# Patient Record
Sex: FEMALE | URBAN - METROPOLITAN AREA
[De-identification: names, ages, dates, MRNs, and addresses within clinical notes are randomized per-mention and may not be internally consistent; named-entity substitution may affect disease eponyms.]

---

## 2021-07-21 ENCOUNTER — TELEPHONE (OUTPATIENT)
Dept: ENT CLINIC | Age: 81
End: 2021-07-21

## 2023-09-09 LAB
CHOLESTEROL (MG/DL) IN SER/PLAS: 156 MG/DL (ref 0–199)
CHOLESTEROL IN HDL (MG/DL) IN SER/PLAS: 69.6 MG/DL
CHOLESTEROL/HDL RATIO: 2.2
LDL: 53 MG/DL (ref 0–99)
TRIGLYCERIDE (MG/DL) IN SER/PLAS: 165 MG/DL (ref 0–149)
VLDL: 33 MG/DL (ref 0–40)

## 2023-12-08 PROBLEM — I22.2 SUBSEQUENT NON-ST ELEVATION (NSTEMI) MYOCARDIAL INFARCTION (MULTI): Status: ACTIVE | Noted: 2023-12-08

## 2023-12-08 PROBLEM — M25.551 HIP PAIN, RIGHT: Status: ACTIVE | Noted: 2023-12-08

## 2023-12-08 PROBLEM — M47.816 SPONDYLOSIS OF LUMBAR SPINE: Status: ACTIVE | Noted: 2023-12-08

## 2023-12-08 PROBLEM — R26.89 IMPAIRED GAIT AND MOBILITY: Status: ACTIVE | Noted: 2023-12-08

## 2023-12-08 PROBLEM — E78.5 HYPERLIPIDEMIA: Status: ACTIVE | Noted: 2023-12-08

## 2023-12-08 PROBLEM — I48.0 PAROXYSMAL ATRIAL FIBRILLATION (MULTI): Status: ACTIVE | Noted: 2023-12-08

## 2023-12-08 PROBLEM — M25.572 ANKLE PAIN, LEFT: Status: ACTIVE | Noted: 2023-12-08

## 2023-12-08 PROBLEM — M54.50 LOWER BACK PAIN: Status: ACTIVE | Noted: 2023-12-08

## 2023-12-08 PROBLEM — I47.29 OTHER VENTRICULAR TACHYCARDIA (MULTI): Status: ACTIVE | Noted: 2023-12-08

## 2023-12-08 PROBLEM — Z86.79 PERSONAL HISTORY OF OTHER DISEASES OF THE CIRCULATORY SYSTEM: Status: ACTIVE | Noted: 2023-12-08

## 2023-12-08 PROBLEM — I10 BENIGN ESSENTIAL HYPERTENSION: Status: ACTIVE | Noted: 2023-12-08

## 2023-12-08 PROBLEM — M43.16 SPONDYLOLISTHESIS OF LUMBAR REGION: Status: ACTIVE | Noted: 2023-12-08

## 2023-12-08 PROBLEM — R00.2 PALPITATIONS: Status: ACTIVE | Noted: 2023-12-08

## 2023-12-08 PROBLEM — M54.16 LUMBAR RADICULOPATHY, CHRONIC: Status: ACTIVE | Noted: 2023-12-08

## 2023-12-08 PROBLEM — M48.061 LUMBAR STENOSIS: Status: ACTIVE | Noted: 2023-12-08

## 2023-12-08 PROBLEM — I47.10 PAROXYSMAL SUPRAVENTRICULAR TACHYCARDIA (CMS-HCC): Status: ACTIVE | Noted: 2023-12-08

## 2023-12-08 PROBLEM — R06.02 SHORTNESS OF BREATH: Status: ACTIVE | Noted: 2023-12-08

## 2023-12-08 PROBLEM — Z98.61 S/P PTCA (PERCUTANEOUS TRANSLUMINAL CORONARY ANGIOPLASTY): Status: ACTIVE | Noted: 2023-12-08

## 2023-12-08 PROBLEM — Z86.73 PERSONAL HISTORY OF TRANSIENT ISCHEMIC ATTACK (TIA), AND CEREBRAL INFARCTION WITHOUT RESIDUAL DEFICITS: Status: ACTIVE | Noted: 2023-12-08

## 2023-12-08 PROBLEM — M19.072 PRIMARY OSTEOARTHRITIS OF LEFT ANKLE: Status: ACTIVE | Noted: 2023-12-08

## 2023-12-08 PROBLEM — M51.369 DEGENERATION OF INTERVERTEBRAL DISC OF LUMBAR REGION: Status: ACTIVE | Noted: 2023-12-08

## 2023-12-08 PROBLEM — R29.898 WEAKNESS OF BOTH HIPS: Status: ACTIVE | Noted: 2023-12-08

## 2023-12-08 PROBLEM — I51.7 LEFT VENTRICULAR HYPERTROPHY: Status: ACTIVE | Noted: 2023-12-08

## 2023-12-08 PROBLEM — M51.36 DEGENERATION OF INTERVERTEBRAL DISC OF LUMBAR REGION: Status: ACTIVE | Noted: 2023-12-08

## 2023-12-08 PROBLEM — I25.10 ASHD (ARTERIOSCLEROTIC HEART DISEASE): Status: ACTIVE | Noted: 2023-12-08

## 2023-12-08 RX ORDER — PHENOL 1.4 %
1 AEROSOL, SPRAY (ML) MUCOUS MEMBRANE DAILY
COMMUNITY
Start: 2018-02-14

## 2023-12-08 RX ORDER — CLOPIDOGREL BISULFATE 75 MG/1
1 TABLET ORAL DAILY
COMMUNITY
Start: 2016-03-31

## 2023-12-08 RX ORDER — ACETAMINOPHEN 500 MG
TABLET ORAL
COMMUNITY
Start: 2017-02-14

## 2023-12-08 RX ORDER — CALCIUM CARBONATE 600 MG
1 TABLET ORAL DAILY
COMMUNITY
Start: 2017-02-14

## 2023-12-08 RX ORDER — METOPROLOL TARTRATE 50 MG/1
1 TABLET ORAL 2 TIMES DAILY
COMMUNITY
Start: 2016-02-17 | End: 2024-04-11 | Stop reason: WASHOUT

## 2023-12-08 RX ORDER — ATORVASTATIN CALCIUM 80 MG/1
1 TABLET, FILM COATED ORAL DAILY
COMMUNITY
Start: 2016-04-15

## 2023-12-08 RX ORDER — AMIODARONE HYDROCHLORIDE 200 MG/1
1 TABLET ORAL DAILY
COMMUNITY
Start: 2022-03-16 | End: 2024-03-14 | Stop reason: SDUPTHER

## 2023-12-28 ENCOUNTER — LAB (OUTPATIENT)
Dept: LAB | Facility: LAB | Age: 83
End: 2023-12-28
Payer: MEDICARE

## 2023-12-28 ENCOUNTER — HOSPITAL ENCOUNTER (OUTPATIENT)
Dept: RADIOLOGY | Facility: HOSPITAL | Age: 83
Discharge: HOME | End: 2023-12-28
Payer: MEDICARE

## 2023-12-28 DIAGNOSIS — I51.7 CARDIOMEGALY: ICD-10-CM

## 2023-12-28 DIAGNOSIS — R06.02 SHORTNESS OF BREATH: ICD-10-CM

## 2023-12-28 DIAGNOSIS — E78.5 HYPERLIPIDEMIA, UNSPECIFIED: ICD-10-CM

## 2023-12-28 DIAGNOSIS — Z91.89 OTHER SPECIFIED PERSONAL RISK FACTORS, NOT ELSEWHERE CLASSIFIED: Primary | ICD-10-CM

## 2023-12-28 DIAGNOSIS — Z91.89 OTHER SPECIFIED PERSONAL RISK FACTORS, NOT ELSEWHERE CLASSIFIED: ICD-10-CM

## 2023-12-28 LAB
ALT SERPL W P-5'-P-CCNC: 37 U/L (ref 7–45)
ANION GAP SERPL CALC-SCNC: 12 MMOL/L (ref 10–20)
AST SERPL W P-5'-P-CCNC: 30 U/L (ref 9–39)
BUN SERPL-MCNC: 17 MG/DL (ref 6–23)
CALCIUM SERPL-MCNC: 9.2 MG/DL (ref 8.6–10.3)
CHLORIDE SERPL-SCNC: 104 MMOL/L (ref 98–107)
CO2 SERPL-SCNC: 30 MMOL/L (ref 21–32)
CREAT SERPL-MCNC: 0.93 MG/DL (ref 0.5–1.05)
GFR SERPL CREATININE-BSD FRML MDRD: 60 ML/MIN/1.73M*2
GLUCOSE SERPL-MCNC: 78 MG/DL (ref 74–99)
POTASSIUM SERPL-SCNC: 4.1 MMOL/L (ref 3.5–5.3)
SODIUM SERPL-SCNC: 142 MMOL/L (ref 136–145)
TSH SERPL-ACNC: 11.84 MIU/L (ref 0.44–3.98)

## 2023-12-28 PROCEDURE — 84460 ALANINE AMINO (ALT) (SGPT): CPT

## 2023-12-28 PROCEDURE — 71045 X-RAY EXAM CHEST 1 VIEW: CPT | Performed by: RADIOLOGY

## 2023-12-28 PROCEDURE — 80048 BASIC METABOLIC PNL TOTAL CA: CPT

## 2023-12-28 PROCEDURE — 36415 COLL VENOUS BLD VENIPUNCTURE: CPT

## 2023-12-28 PROCEDURE — 84450 TRANSFERASE (AST) (SGOT): CPT

## 2023-12-28 PROCEDURE — 71045 X-RAY EXAM CHEST 1 VIEW: CPT

## 2023-12-28 PROCEDURE — 84443 ASSAY THYROID STIM HORMONE: CPT

## 2023-12-29 NOTE — RESULT ENCOUNTER NOTE
Abnormal results, Please let Dr Pringle know aboutr TSH and when she can stop amiodarone and also fwd to PCP for action, please let pt know

## 2024-01-03 ENCOUNTER — TELEPHONE (OUTPATIENT)
Dept: CARDIOLOGY | Facility: CLINIC | Age: 84
End: 2024-01-03
Payer: MEDICARE

## 2024-01-03 RX ORDER — LEVOTHYROXINE SODIUM 25 UG/1
25 TABLET ORAL
COMMUNITY
End: 2024-04-11 | Stop reason: WASHOUT

## 2024-01-03 NOTE — TELEPHONE ENCOUNTER
Called her with lab and chest xray results from 12/28/23.  Chest xray is stable.  TSH is more elevated.  She is on levothyroxine - medication list updated with that.  She has an appointment coming up with Dr. Melendez later this month.  She has follow up scheduled with JANET Christian  1/16/2024 at 2:30 PM.  She confirmed that appointment.    Once reviewed by Dr. Pringle, will fax results and plan to Dr. Melendez.

## 2024-01-03 NOTE — TELEPHONE ENCOUNTER
----- Message from Christophe Jones MD sent at 12/29/2023  2:04 PM EST -----  Results reviewed. RN to call patient. No critical results, follow up as usual to discuss in details.

## 2024-01-10 NOTE — PROGRESS NOTES
Chief Complaint/Reason for Visit:   Patient is coming in today as a 6 month Cardiovascular follow up.     History Of Present Illness:      Ms. Ladd is coming in today as a 6-month cardiovascular follow-up.  We have followed this patient previously for coronary artery disease, atrial fibrillation,  Paroxysmal SVT,  Hypertrophic cardiomyopathy  Due to hypertensive heart disease,  And a prior TIA.  In 2020  Patient had an NSTEMI  Requiring a mid LAD stent.  Patient has had prior cardioversions in 2021  And again in 2022.  At her last office visit,  She was maintaining sinus rhythm with the use of amiodarone.  Recent chest x-ray done in December showed no acute cardiopulmonary disease.  Amiodarone labs done at the same time showed  An elevated TSH (11.84),  We recommended follow-up with her PCP.    Patient comes in today without any cardiovascular complaints.  She denies any chest pain, pressure, palpitations, or orthopnea.  She does report chronic lower extremity edema which is at baseline.  She tells me she is fairly sedentary and also eats a lot of salty foods.  Patient has had no recent hospitalizations or emergency room visits.  As far as she can tell, she is maintaining sinus rhythm with the use of amiodarone.    Past Medical History:  She has a past medical history of Other ventricular tachycardia (CMS/HCC), Personal history of other diseases of the circulatory system, and Personal history of transient ischemic attack (TIA), and cerebral infarction without residual deficits.    Past Surgical History:  She has a past surgical history that includes Hysterectomy (02/07/2017) and Other surgical history (02/07/2017).      Social History:  She reports that she has never smoked. She has never used smokeless tobacco. Alcohol use questions deferred to the physician. Drug use questions deferred to the physician.    Family History:  No family history on file.     Allergies:  Aspirin, Lisinopril, and  Amoxicillin    Medications:  Current Outpatient Medications   Medication Instructions    acetaminophen (Tylenol) 500 mg tablet oral    amiodarone (Pacerone) 200 mg tablet 1 tablet, oral, Daily    apixaban (Eliquis) 5 mg tablet 1 tablet, oral, 2 times daily    atorvastatin (Lipitor) 80 mg tablet 1 tablet, oral, Daily    calcium carbonate 600 mg calcium (1,500 mg) tablet 1 tablet, oral, Daily    clopidogrel (Plavix) 75 mg tablet 1 tablet, oral, Daily    doxycycline (VIBRA-TABS) 100 mg, oral, 2 times daily    levothyroxine (SYNTHROID, LEVOXYL) 25 mcg, oral, Daily before breakfast    metoprolol tartrate (Lopressor) 50 mg tablet 1 tablet, oral, 2 times daily    multivitamin with minerals (Adults Multivitamin) tablet 1 tablet, oral, Daily       Review of Systems:  Constitutional: feeling tired, but not feeling poorly.   Eyes: eyesight problems and blurred vision.   ENT: no hearing loss.   Cardiovascular: chronic lower extremity edema, but no chest pain, no tightness or heavy pressure and no palpitations . ankle edema.   Respiratory: shortness of breath during exertion, but no orthopnea.   Gastrointestinal: no change in bowel habits and no blood in stools.   Genitourinary: no urinary frequency and no hematuria.   Musculoskeletal: no arthralgias.   Skin: no skin rashes.   Neurological: frequent falls  and dizziness.   Psychiatric: no confusion and no memory lapses or loss.   Endocrine: no thyroid disorder and no diabetes mellitus.        Vitals  Visit Vitals  /50   Pulse 58   Wt 84.4 kg (186 lb)   BMI 30.95 kg/m²   Smoking Status Never   BSA 1.97 m²        ROS:  Constitutional: feeling tired, but not feeling poorly.   Eyes: eyesight problems and blurred vision.   ENT: no hearing loss.   Cardiovascular: lower extremity edema, but no chest pain, no tightness or heavy pressure and no palpitations . ankle edema.   Respiratory: shortness of breath during exertion, but no orthopnea.   Gastrointestinal: no change in bowel  habits and no blood in stools.   Genitourinary: no urinary frequency and no hematuria.   Musculoskeletal: no arthralgias. Uses cane  Skin: no skin rashes.   Neurological: frequent falls  and dizziness.   Psychiatric: no confusion and no memory lapses or loss.   Endocrine: no thyroid disorder and no diabetes mellitus.       PHYSICAL EXAM:   Constitutional: alert and in no acute distress.   Eyes: no erythema, swelling or discharge from the eye .   Ears, Nose, Mouth, and Throat: external inspection of ears and nose is normal .   Neck: neck is supple, symmetric, trachea midline, no masses  and no thyromegaly .   Pulmonary: no increased work of breathing or signs of respiratory distress  and lungs clear to auscultation.    Cardiovascular: carotid pulses 2+ bilaterally with no bruit , JVP was normal,  the heart rate was normal the rhythm was regular, normal S1, normal S2, no S3, no S4 no murmurs were heard., pedal pulses 2+ bilaterally, ankles with 1+ edema  Abdomen: abdomen non-tender, no masses .   Musculoskeletal: normal gait.   Skin: skin warm and dry, normal skin turgor .   Neurologic: non-focal neurologic examination.   Psychiatric oriented to person, place and time  and normal mood and affect .          Last Labs:  CBC -  Lab Results   Component Value Date    WBC 7.1 09/23/2022    HGB 13.2 09/23/2022    HCT 39.6 09/23/2022    MCV 96 09/23/2022     09/23/2022     Lab Results   Component Value Date    GLUCOSE 78 12/28/2023    CALCIUM 9.2 12/28/2023     12/28/2023    K 4.1 12/28/2023    CO2 30 12/28/2023     12/28/2023    BUN 17 12/28/2023    CREATININE 0.93 12/28/2023      CMP -  Lab Results   Component Value Date    CALCIUM 9.2 12/28/2023    PROT 6.7 05/09/2022    ALBUMIN 4.3 05/09/2022    AST 30 12/28/2023    ALT 37 12/28/2023    ALKPHOS 67 05/09/2022    BILITOT 0.6 05/09/2022       LIPID PANEL -   Lab Results   Component Value Date    CHOL 156 09/09/2023    TRIG 165 (H) 09/09/2023    HDL 69.6  09/09/2023    CHHDL 2.2 09/09/2023    LDLF 53 09/09/2023    VLDL 33 09/09/2023       Lab Results   Component Value Date     (H) 03/24/2021    HGBA1C 6.1 09/07/2020       Last Cardiology Tests:   EKG: EKG done in the office today and personally reviewed shows sinus bradycardia at 58 bpm, she has a first-degree AV block.  QT interval 478 ms.  There are no significant ST or T wave abnormalities.  This will go to Dr. Jones for review.    Echo:9-7-20  CONCLUSIONS:   1. The left ventricular systolic function is normal with a 55-60% estimated ejection fraction.   2. Moderately increased left ventricular septal thickness.   3. Spectral Doppler shows an impaired relaxation pattern of left ventricular diastolic filling.   4. There is mild to moderate asymmetric left ventricular hypertrophy.   5. The left atrium is moderately dilated.   6. The right atrium is moderately dilated.    Cath:9-8-20  CONCLUSIONS:   1. NSTEMI. Prox-mid LAD calcified, eccentric ~ 70% stenosis. Abnormal by RFR, 0.85.   2. Successful PCI prox-mid LAD with Xience Jessica 2.75 x 28mm LIZY, postdil NC 3.0mm balloon at 16-20 es.   3. Mild nonobstructive disease involving prox-mid LCx and proximal RCA.   4. No significant LV-AO peak to peak pullback gradient.   5. Left Ventricular end-diastolic pressure = 14.       Lab review: I have personally reviewed the laboratory result(s)     Assessment/Plan:  Persistent atrial fibrillation: Patient has a history of atrial fibrillation and has had cardioversions done in 2021 and again in 2022.  She has been maintaining sinus rhythm with the use of amiodarone.  Her recent chest x-ray looked good along with her lab work other than an elevated TSH.  Patient was advised to follow-up with her PCP regarding her thyroid replacement.  She continues on amiodarone 200 mg daily.  Patient has an elevated [id number] score and has been anticoagulated with Eliquis.  She is not having any abnormal bleeding or bruising  and will continue on anticoagulation.  EKG done in the office today is acceptable, she is in sinus rhythm, QT interval is 478 ms.    CAD: Patient has a history of an NSTEMI in 2020 which required a mid LAD stent.  Patient is angina class 0.  She will continue on her current cardiac regimen.    Paroxysmal supraventricular tachycardia: Patient reports no episodes of rapid heartbeat/tachycardia.  She will continue on metoprolol and amiodarone.    Hypertension: Blood pressure today is controlled at 136/50.  Patient will continue on her current dose of metoprolol.  I encouraged her to be on a heart healthy low-sodium diet.    Patient was given an order for amiodarone labs to be done in 6 months.  She will follow-up with Dr. Jones shortly after.  She is going to follow-up with her PCP regarding her thyroid issues.  Patient instructed to call with any cardiovascular complaints. All questions were answered.       Dragon dictation was utilized to create this document. Quite often unanticipated grammatical, syntax,  and other interpretive errors are inadvertently transcribed by the computer software.  Please disregard these errors.  Please excuse any errors that have escaped final proofreading.                Dasha Bender, APRN-CNP

## 2024-01-16 ENCOUNTER — OFFICE VISIT (OUTPATIENT)
Dept: CARDIOLOGY | Facility: CLINIC | Age: 84
End: 2024-01-16
Payer: MEDICARE

## 2024-01-16 VITALS
DIASTOLIC BLOOD PRESSURE: 50 MMHG | HEART RATE: 58 BPM | BODY MASS INDEX: 30.95 KG/M2 | SYSTOLIC BLOOD PRESSURE: 136 MMHG | WEIGHT: 186 LBS

## 2024-01-16 DIAGNOSIS — I47.10 PAROXYSMAL SUPRAVENTRICULAR TACHYCARDIA (CMS-HCC): ICD-10-CM

## 2024-01-16 DIAGNOSIS — I48.19 PERSISTENT ATRIAL FIBRILLATION (MULTI): ICD-10-CM

## 2024-01-16 DIAGNOSIS — I25.10 ASHD (ARTERIOSCLEROTIC HEART DISEASE): Primary | ICD-10-CM

## 2024-01-16 DIAGNOSIS — E78.5 HYPERLIPIDEMIA, UNSPECIFIED HYPERLIPIDEMIA TYPE: ICD-10-CM

## 2024-01-16 DIAGNOSIS — I10 BENIGN ESSENTIAL HYPERTENSION: ICD-10-CM

## 2024-01-16 PROBLEM — I70.203 ATHEROSCLEROSIS OF NATIVE ARTERY OF BOTH LOWER EXTREMITIES (CMS-HCC): Status: ACTIVE | Noted: 2023-09-18

## 2024-01-16 PROCEDURE — 1036F TOBACCO NON-USER: CPT | Performed by: NURSE PRACTITIONER

## 2024-01-16 PROCEDURE — 3078F DIAST BP <80 MM HG: CPT | Performed by: NURSE PRACTITIONER

## 2024-01-16 PROCEDURE — 99214 OFFICE O/P EST MOD 30 MIN: CPT | Performed by: NURSE PRACTITIONER

## 2024-01-16 PROCEDURE — 1159F MED LIST DOCD IN RCRD: CPT | Performed by: NURSE PRACTITIONER

## 2024-01-16 PROCEDURE — 3075F SYST BP GE 130 - 139MM HG: CPT | Performed by: NURSE PRACTITIONER

## 2024-01-16 RX ORDER — DOXYCYCLINE HYCLATE 100 MG
100 TABLET ORAL 2 TIMES DAILY
COMMUNITY
Start: 2023-07-31 | End: 2024-04-17 | Stop reason: WASHOUT

## 2024-01-16 RX ORDER — LEVOTHYROXINE SODIUM 25 UG/1
TABLET ORAL
COMMUNITY
Start: 2023-09-12 | End: 2024-01-16 | Stop reason: SDUPTHER

## 2024-01-16 NOTE — PATIENT INSTRUCTIONS
Continue on current meds  Heart healthy, low sodium diet  Mediterranean diet is recommended  Follow up with your PCP RE: thyroid issues  Amio labs due in 6 months  Follow up with Dr Jones in 6 months

## 2024-03-14 ENCOUNTER — TELEPHONE (OUTPATIENT)
Dept: CARDIOLOGY | Facility: CLINIC | Age: 84
End: 2024-03-14
Payer: MEDICARE

## 2024-03-14 DIAGNOSIS — I48.19 PERSISTENT ATRIAL FIBRILLATION (MULTI): Primary | ICD-10-CM

## 2024-03-14 DIAGNOSIS — I48.0 PAROXYSMAL ATRIAL FIBRILLATION (MULTI): Primary | ICD-10-CM

## 2024-03-14 RX ORDER — AMIODARONE HYDROCHLORIDE 200 MG/1
200 TABLET ORAL DAILY
Qty: 90 TABLET | Refills: 0 | Status: SHIPPED | OUTPATIENT
Start: 2024-03-14 | End: 2024-04-17 | Stop reason: SDUPTHER

## 2024-03-14 NOTE — TELEPHONE ENCOUNTER
----- Message from Arun Gonsalez sent at 3/13/2024  9:24 AM EDT -----  Regarding: med refill  Patient wants to get her amiodarone (Pacerone) 200 mg tablet  refilled and sent to [facility] pharmacy. Please and Thank you

## 2024-04-11 PROBLEM — B35.1 ONYCHOMYCOSIS: Status: ACTIVE | Noted: 2023-09-18

## 2024-04-11 RX ORDER — LEVOTHYROXINE SODIUM 50 UG/1
50 TABLET ORAL DAILY
COMMUNITY
Start: 2024-02-08

## 2024-04-11 RX ORDER — METOPROLOL TARTRATE 75 MG/1
75 TABLET, FILM COATED ORAL 2 TIMES DAILY
COMMUNITY
Start: 2024-02-08

## 2024-04-17 ENCOUNTER — OFFICE VISIT (OUTPATIENT)
Dept: CARDIOLOGY | Facility: CLINIC | Age: 84
End: 2024-04-17
Payer: MEDICARE

## 2024-04-17 VITALS
BODY MASS INDEX: 30.99 KG/M2 | WEIGHT: 186 LBS | DIASTOLIC BLOOD PRESSURE: 62 MMHG | SYSTOLIC BLOOD PRESSURE: 120 MMHG | HEART RATE: 53 BPM | HEIGHT: 65 IN

## 2024-04-17 DIAGNOSIS — I47.10 PAROXYSMAL SUPRAVENTRICULAR TACHYCARDIA (CMS-HCC): Primary | ICD-10-CM

## 2024-04-17 DIAGNOSIS — I48.0 PAROXYSMAL ATRIAL FIBRILLATION (MULTI): ICD-10-CM

## 2024-04-17 PROCEDURE — 3074F SYST BP LT 130 MM HG: CPT | Performed by: INTERNAL MEDICINE

## 2024-04-17 PROCEDURE — 99214 OFFICE O/P EST MOD 30 MIN: CPT | Performed by: INTERNAL MEDICINE

## 2024-04-17 PROCEDURE — 1036F TOBACCO NON-USER: CPT | Performed by: INTERNAL MEDICINE

## 2024-04-17 PROCEDURE — 1160F RVW MEDS BY RX/DR IN RCRD: CPT | Performed by: INTERNAL MEDICINE

## 2024-04-17 PROCEDURE — 93000 ELECTROCARDIOGRAM COMPLETE: CPT | Performed by: INTERNAL MEDICINE

## 2024-04-17 PROCEDURE — 3078F DIAST BP <80 MM HG: CPT | Performed by: INTERNAL MEDICINE

## 2024-04-17 PROCEDURE — 1159F MED LIST DOCD IN RCRD: CPT | Performed by: INTERNAL MEDICINE

## 2024-04-17 RX ORDER — AMIODARONE HYDROCHLORIDE 200 MG/1
200 TABLET ORAL DAILY
Qty: 90 TABLET | Refills: 3 | Status: SHIPPED | OUTPATIENT
Start: 2024-04-17 | End: 2025-04-17

## 2024-04-17 NOTE — PROGRESS NOTES
[facility]    Patient Name: Delmis Ladd  Patient : 1940    Referred  for   Chief Complaint   Patient presents with    Follow-up        Delmis Ladd is a 83 y.o. year old female patient with    1. Hypertension-      2. LV hypertrophy-moderate     3. CAD- s/p PCI     4. Atrial fibrillation- first diagnosed 2021 with DCCV 2021. Initial successful cardioversion though seems to have been in AF most of the year. Patient denies palpitations though has noticed increased fatigue over the past year with occasional SOB with exertion. performed a DC cardioversion again on May 9, 2022. She has been feeling better since and maintaining sinus rhythm. Had acquired hypothyroidism now on low dose Synthroid.      Past Medical History:  She has a past medical history of Other ventricular tachycardia (Multi), Personal history of other diseases of the circulatory system, and Personal history of transient ischemic attack (TIA), and cerebral infarction without residual deficits.    Past Surgical History:  She has a past surgical history that includes Hysterectomy (2017) and Other surgical history (2017).      Social History:  She reports that she has never smoked. She has never used smokeless tobacco. Alcohol use questions deferred to the physician. Drug use questions deferred to the physician.    Family History:  No family history on file.     Allergies:  Aspirin, Lisinopril, and Amoxicillin    Outpatient Medications:  Current Outpatient Medications   Medication Instructions    acetaminophen (Tylenol) 500 mg tablet oral    amiodarone (PACERONE) 200 mg, oral, Daily    apixaban (Eliquis) 5 mg tablet 1 tablet, oral, 2 times daily    atorvastatin (Lipitor) 80 mg tablet 1 tablet, oral, Daily    calcium carbonate 600 mg calcium (1,500 mg) tablet 1 tablet, oral, Daily    clopidogrel (Plavix) 75 mg tablet 1 tablet, oral, Daily    doxycycline (VIBRA-TABS) 100 mg, oral,  "2 times daily    levothyroxine (SYNTHROID, LEVOXYL) 50 mcg, oral, Daily    metoprolol tartrate (LOPRESSOR) 75 mg, oral, 2 times daily    multivitamin with minerals (Adults Multivitamin) tablet 1 tablet, oral, Daily        ROS:  A 14 point review of systems was done and is negative other than as stated in HPI    Vitals:  Vitals:    04/17/24 1430   BP: 120/62   Pulse: 53   Weight: 84.4 kg (186 lb)   Height: 1.651 m (5' 5\")       Physical Exam:   Physical Exam  Cardiovascular:      Rate and Rhythm: Normal rate and regular rhythm.   Pulmonary:      Effort: Pulmonary effort is normal.   Musculoskeletal:         General: No swelling.   Skin:     Findings: No rash.   Neurological:      General: No focal deficit present.      Mental Status: She is alert and oriented to person, place, and time.   Psychiatric:         Mood and Affect: Mood normal.          Intake/Output:   No intake/output data recorded.    Outpatient Medications  Current Outpatient Medications on File Prior to Visit   Medication Sig Dispense Refill    acetaminophen (Tylenol) 500 mg tablet Take by mouth.      amiodarone (Pacerone) 200 mg tablet Take 1 tablet (200 mg) by mouth once daily. 90 tablet 0    apixaban (Eliquis) 5 mg tablet Take 1 tablet (5 mg) by mouth 2 times a day.      atorvastatin (Lipitor) 80 mg tablet Take 1 tablet (80 mg) by mouth once daily.      calcium carbonate 600 mg calcium (1,500 mg) tablet Take 1 tablet (1,500 mg) by mouth once daily.      clopidogrel (Plavix) 75 mg tablet Take 1 tablet (75 mg) by mouth once daily.      levothyroxine (Synthroid, Levoxyl) 50 mcg tablet Take 1 tablet (50 mcg) by mouth once daily.      metoprolol tartrate (Lopressor) 75 mg tablet Take 1 tablet (75 mg) by mouth 2 times a day.      multivitamin with minerals (Adults Multivitamin) tablet Take 1 tablet by mouth once daily.      doxycycline (Vibra-Tabs) 100 mg tablet Take 1 tablet (100 mg) by mouth 2 times a day.      [DISCONTINUED] levothyroxine (Synthroid, " Levoxyl) 25 mcg tablet Take 1 tablet (25 mcg) by mouth once daily in the morning. Take before meals.      [DISCONTINUED] metoprolol tartrate (Lopressor) 50 mg tablet Take 1 tablet by mouth 2 times a day.       No current facility-administered medications on file prior to visit.       Labs: (past 26 weeks)  Recent Results (from the past 4368 hour(s))   Alanine Aminotransferase    Collection Time: 12/28/23  1:51 PM   Result Value Ref Range    ALT 37 7 - 45 U/L   Aspartate Aminotransferase    Collection Time: 12/28/23  1:51 PM   Result Value Ref Range    AST 30 9 - 39 U/L   Basic Metabolic Panel    Collection Time: 12/28/23  1:51 PM   Result Value Ref Range    Glucose 78 74 - 99 mg/dL    Sodium 142 136 - 145 mmol/L    Potassium 4.1 3.5 - 5.3 mmol/L    Chloride 104 98 - 107 mmol/L    Bicarbonate 30 21 - 32 mmol/L    Anion Gap 12 10 - 20 mmol/L    Urea Nitrogen 17 6 - 23 mg/dL    Creatinine 0.93 0.50 - 1.05 mg/dL    eGFR 60 (L) >60 mL/min/1.73m*2    Calcium 9.2 8.6 - 10.3 mg/dL   Thyroid Stimulating Hormone    Collection Time: 12/28/23  1:51 PM   Result Value Ref Range    Thyroid Stimulating Hormone 11.84 (H) 0.44 - 3.98 mIU/L       ECG  No results found for this or any previous visit (from the past 4464 hour(s)).    Echocardiogram  No results found for this or any previous visit from the past 1095 days.      Assessment/Plan:     Patient doing well for the past few years keeping sinus rhythm on amiodarone. No issues save from hypothyroidism now on replacement. I recommend life long amio use. Continue to follow with general cardiology with regards to potential toxicities.

## 2024-05-16 ENCOUNTER — APPOINTMENT (OUTPATIENT)
Dept: NEUROLOGY | Facility: CLINIC | Age: 84
End: 2024-05-16
Payer: MEDICARE

## 2024-06-05 DIAGNOSIS — M25.552 LEFT HIP PAIN: ICD-10-CM

## 2024-06-11 ENCOUNTER — APPOINTMENT (OUTPATIENT)
Dept: RADIOLOGY | Facility: CLINIC | Age: 84
End: 2024-06-11
Payer: MEDICARE

## 2024-06-11 ENCOUNTER — APPOINTMENT (OUTPATIENT)
Dept: ORTHOPEDIC SURGERY | Facility: CLINIC | Age: 84
End: 2024-06-11
Payer: MEDICARE

## 2024-07-16 ENCOUNTER — APPOINTMENT (OUTPATIENT)
Dept: CARDIOLOGY | Facility: CLINIC | Age: 84
End: 2024-07-16
Payer: MEDICARE

## 2024-07-16 VITALS
HEART RATE: 55 BPM | HEIGHT: 65 IN | WEIGHT: 174 LBS | SYSTOLIC BLOOD PRESSURE: 118 MMHG | BODY MASS INDEX: 28.99 KG/M2 | DIASTOLIC BLOOD PRESSURE: 60 MMHG

## 2024-07-16 DIAGNOSIS — E78.5 HYPERLIPIDEMIA, UNSPECIFIED HYPERLIPIDEMIA TYPE: ICD-10-CM

## 2024-07-16 DIAGNOSIS — I51.7 LEFT VENTRICULAR HYPERTROPHY: ICD-10-CM

## 2024-07-16 DIAGNOSIS — I10 BENIGN ESSENTIAL HYPERTENSION: ICD-10-CM

## 2024-07-16 DIAGNOSIS — I25.10 ASHD (ARTERIOSCLEROTIC HEART DISEASE): Primary | ICD-10-CM

## 2024-07-16 DIAGNOSIS — Z79.899 AT RISK FOR AMIODARONE TOXICITY WITH LONG TERM USE: ICD-10-CM

## 2024-07-16 DIAGNOSIS — Z98.61 S/P PTCA (PERCUTANEOUS TRANSLUMINAL CORONARY ANGIOPLASTY): ICD-10-CM

## 2024-07-16 DIAGNOSIS — Z91.89 AT RISK FOR AMIODARONE TOXICITY WITH LONG TERM USE: ICD-10-CM

## 2024-07-16 DIAGNOSIS — I48.0 PAROXYSMAL ATRIAL FIBRILLATION (MULTI): ICD-10-CM

## 2024-07-16 PROBLEM — I22.2 SUBSEQUENT NON-ST ELEVATION (NSTEMI) MYOCARDIAL INFARCTION (MULTI): Status: RESOLVED | Noted: 2023-12-08 | Resolved: 2024-07-16

## 2024-07-16 PROCEDURE — 99215 OFFICE O/P EST HI 40 MIN: CPT | Performed by: INTERNAL MEDICINE

## 2024-07-16 PROCEDURE — 1159F MED LIST DOCD IN RCRD: CPT | Performed by: INTERNAL MEDICINE

## 2024-07-16 PROCEDURE — 3078F DIAST BP <80 MM HG: CPT | Performed by: INTERNAL MEDICINE

## 2024-07-16 PROCEDURE — 93000 ELECTROCARDIOGRAM COMPLETE: CPT | Performed by: INTERNAL MEDICINE

## 2024-07-16 PROCEDURE — 1036F TOBACCO NON-USER: CPT | Performed by: INTERNAL MEDICINE

## 2024-07-16 PROCEDURE — 1160F RVW MEDS BY RX/DR IN RCRD: CPT | Performed by: INTERNAL MEDICINE

## 2024-07-16 PROCEDURE — 3074F SYST BP LT 130 MM HG: CPT | Performed by: INTERNAL MEDICINE

## 2024-07-16 RX ORDER — AMIODARONE HYDROCHLORIDE 200 MG/1
200 TABLET ORAL DAILY
Qty: 90 TABLET | Refills: 1 | Status: SHIPPED | OUTPATIENT
Start: 2024-07-16

## 2024-07-16 RX ORDER — METOPROLOL TARTRATE 75 MG/1
75 TABLET, FILM COATED ORAL 2 TIMES DAILY
Qty: 180 TABLET | Refills: 1 | Status: SHIPPED | OUTPATIENT
Start: 2024-07-16

## 2024-07-16 RX ORDER — ATORVASTATIN CALCIUM 80 MG/1
80 TABLET, FILM COATED ORAL DAILY
Qty: 90 TABLET | Refills: 1 | Status: SHIPPED | OUTPATIENT
Start: 2024-07-16

## 2024-07-16 RX ORDER — CLOPIDOGREL BISULFATE 75 MG/1
75 TABLET ORAL DAILY
Qty: 90 TABLET | Refills: 1 | Status: SHIPPED | OUTPATIENT
Start: 2024-07-16

## 2024-07-16 ASSESSMENT — ENCOUNTER SYMPTOMS
OCCASIONAL FEELINGS OF UNSTEADINESS: 1
DEPRESSION: 0
LOSS OF SENSATION IN FEET: 0

## 2024-07-16 NOTE — PROGRESS NOTES
"Chief Complaint:   Follow-up (6 mo  f/u)     History Of Present Illness:    Delmis Ladd is a 83 y.o. female presenting for annual follow-up.    She has a past medical history of TIA, CAD, Paroxysmal afib, paroxysmal SVT and wide-complex tachycardia thought to be SVT with aberrant conduction, HCM, likely from hypertensive heart disease. She suffered from a non-STEMI in September 2020 and had a mid LAD stent. Her LV function was preserved.  She was going to cardiac rehabilitation,but became tired and symptomatic with persistent atrial fibrillation. We performed DCCV March 25, 2021, she maintained sinus rhythm until December 2021 and felt better initially. Then she had recurrence of atrial fibrillation and then developed COVID and was hospitalized for several weeks. After discharge she followed up with electrophysiologist Dr. Pringle who after discussion of antiarrhythmic versus ablation started on amiodarone and performed a DC cardioversion again on May 9, 2022. She has been feeling better since and maintaining sinus rhythm.  Recently developed hypothyroidism and was referred back to EP for consideration of alternative treatment approach however it was recommended that amiodarone with continued along with treatment with thyroid supplements.       She does not have chest pain, shortness of breath, orthopnea, PND, ankle swelling, palpitations, lightheadedness or any history of syncope. She had a normal stress echocardiogram in March 2015.  EKG today shows NSR. QTc interval 474 ms.  Echo done on January 20, 2019 shows severe upper septal hypertrophy measuring 2 cm and stage I diastolic dysfunction, preserved LV function.     She had a pharmacologic stress MPI done on January 20, 2019 showing normal perfusion and normal LV function. .     Last Recorded Vitals:  Vitals:    07/16/24 1500   BP: 118/60   Pulse: 55   Weight: 78.9 kg (174 lb)   Height: 1.651 m (5' 5\")       Past Medical History:  She has a past medical " history of Other ventricular tachycardia (Multi), Personal history of other diseases of the circulatory system, and Personal history of transient ischemic attack (TIA), and cerebral infarction without residual deficits.    Past Surgical History:  She has a past surgical history that includes Hysterectomy (02/07/2017) and Other surgical history (02/07/2017).      Social History:  She reports that she has never smoked. She has never used smokeless tobacco. Alcohol use questions deferred to the physician. Drug use questions deferred to the physician.    Family History:  No family history on file.     Allergies:  Aspirin, Lisinopril, and Amoxicillin    Outpatient Medications:  Current Outpatient Medications   Medication Instructions    acetaminophen (TYLENOL) 500 mg, oral, As needed    amiodarone (PACERONE) 200 mg, oral, Daily    apixaban (Eliquis) 5 mg tablet 1 tablet, oral, 2 times daily    atorvastatin (Lipitor) 80 mg tablet 1 tablet, oral, Daily    calcium carbonate 600 mg calcium (1,500 mg) tablet 1 tablet, oral, Daily    clopidogrel (Plavix) 75 mg tablet 1 tablet, oral, Daily    levothyroxine (SYNTHROID, LEVOXYL) 50 mcg, oral, Daily    metoprolol tartrate (LOPRESSOR) 75 mg, oral, 2 times daily    multivitamin with minerals (Adults Multivitamin) tablet 1 tablet, oral, Daily       Physical Exam:  Physical Exam  Vitals reviewed.   Constitutional:       Appearance: Normal appearance.   Neck:      Vascular: No carotid bruit or JVD.   Cardiovascular:      Rate and Rhythm: Normal rate and regular rhythm.      Pulses: Normal pulses.      Heart sounds: Normal heart sounds, S1 normal and S2 normal.   Pulmonary:      Effort: Pulmonary effort is normal. No respiratory distress.      Breath sounds: No wheezing or rales.   Abdominal:      General: Abdomen is flat.      Palpations: Abdomen is soft.   Musculoskeletal:      Right lower leg: No edema.      Left lower leg: No edema.   Skin:     General: Skin is warm.   Neurological:  "     Mental Status: She is alert and oriented to person, place, and time. Mental status is at baseline.   Psychiatric:         Mood and Affect: Mood normal.         Behavior: Behavior normal.           Last Labs:  CBC -  Lab Results   Component Value Date    WBC 7.1 09/23/2022    HGB 13.2 09/23/2022    HCT 39.6 09/23/2022    MCV 96 09/23/2022     09/23/2022       CMP -  Lab Results   Component Value Date    CALCIUM 9.2 12/28/2023    PROT 6.7 05/09/2022    ALBUMIN 4.3 05/09/2022    AST 30 12/28/2023    ALT 37 12/28/2023    ALKPHOS 67 05/09/2022    BILITOT 0.6 05/09/2022       LIPID PANEL -   Lab Results   Component Value Date    CHOL 156 09/09/2023    TRIG 165 (H) 09/09/2023    HDL 69.6 09/09/2023    CHHDL 2.2 09/09/2023    LDLF 53 09/09/2023    VLDL 33 09/09/2023       RENAL FUNCTION PANEL -   Lab Results   Component Value Date    GLUCOSE 78 12/28/2023     12/28/2023    K 4.1 12/28/2023     12/28/2023    CO2 30 12/28/2023    ANIONGAP 12 12/28/2023    BUN 17 12/28/2023    CREATININE 0.93 12/28/2023    CALCIUM 9.2 12/28/2023    ALBUMIN 4.3 05/09/2022        Lab Results   Component Value Date     (H) 03/24/2021    HGBA1C 6.1 09/07/2020       Last Cardiology Tests:  ECG:  ECG 12 lead (Clinic Performed) 04/17/2024      Echo:  No results found for this or any previous visit from the past 1095 days.      Ejection Fractions:  No results found for: \"EF\"    Cath:  No results found for this or any previous visit from the past 1095 days.      Stress Test:  No results found for this or any previous visit from the past 1095 days.      Cardiac Imaging:  No results found for this or any previous visit from the past 1095 days.          Assessment/Plan      In summary Ms Ladd is a 83-year-old lady with      1- paroxysmal SVT. Her symptoms are under control with a small dose of a beta blocker.      2- HTN- Her blood pressure is well controlled.     3- Hx of TIA- She is on statins for secondary prevention.   "   4-hypertensive heart disease-no evidence of heart failure. Blood pressure at goal.  Will repeat echo.     5-CAD-history of non-STEMI, currently plavix, statins, status post PCI to LAD.  We will consider stopping Plavix in a few years.     6-persistent atrial fibrillation-likely cause of tiredness, improved with DCCV after started on amiodarone. Amiodarone related toxicities and need for regular monitoring, specifically eye examination once a year discussed with her. She agrees.     Chads-vasc=7, on oral anticoagulants. On Eliquis.  We will continue.         Christophe Jones MD

## 2024-07-17 ENCOUNTER — TELEPHONE (OUTPATIENT)
Dept: CARDIOLOGY | Facility: CLINIC | Age: 84
End: 2024-07-17
Payer: MEDICARE

## 2024-07-17 NOTE — TELEPHONE ENCOUNTER
Patient called, is out of medication, would like a refill levothyroxine (Synthroid, Levoxyl) 50 mcg tablet

## 2024-07-24 NOTE — TELEPHONE ENCOUNTER
Left a message - thisis not a cardiac medication and not managed by Dr. Jones.  Likely manged by PCP.  You could call your pharmacy to see who filled it last.

## 2024-07-30 ENCOUNTER — APPOINTMENT (OUTPATIENT)
Dept: NEUROLOGY | Facility: CLINIC | Age: 84
End: 2024-07-30
Payer: MEDICARE

## 2024-08-14 ENCOUNTER — HOSPITAL ENCOUNTER (OUTPATIENT)
Dept: CARDIOLOGY | Facility: HOSPITAL | Age: 84
Discharge: HOME | End: 2024-08-14
Payer: MEDICARE

## 2024-08-14 DIAGNOSIS — E78.5 HYPERLIPIDEMIA, UNSPECIFIED HYPERLIPIDEMIA TYPE: ICD-10-CM

## 2024-08-14 DIAGNOSIS — I48.0 PAROXYSMAL ATRIAL FIBRILLATION (MULTI): ICD-10-CM

## 2024-08-14 DIAGNOSIS — I51.7 LEFT VENTRICULAR HYPERTROPHY: ICD-10-CM

## 2024-08-14 DIAGNOSIS — Z98.61 S/P PTCA (PERCUTANEOUS TRANSLUMINAL CORONARY ANGIOPLASTY): ICD-10-CM

## 2024-08-14 DIAGNOSIS — I25.10 ASHD (ARTERIOSCLEROTIC HEART DISEASE): ICD-10-CM

## 2024-08-14 DIAGNOSIS — I10 BENIGN ESSENTIAL HYPERTENSION: ICD-10-CM

## 2024-08-14 LAB
AORTIC VALVE MEAN GRADIENT: 4.7 MMHG
AORTIC VALVE PEAK VELOCITY: 1.47 M/S
AV PEAK GRADIENT: 8.6 MMHG
AVA (PEAK VEL): 2.97 CM2
AVA (VTI): 3.29 CM2
EJECTION FRACTION: 61 %
GLOBAL LONGITUDINAL STRAIN: 15.5 %
LEFT ATRIUM VOLUME AREA LENGTH INDEX BSA: 34.5 ML/M2
LEFT VENTRICLE INTERNAL DIMENSION DIASTOLE: 4.42 CM (ref 3.5–6)
LEFT VENTRICULAR OUTFLOW TRACT DIAMETER: 1.99 CM
MITRAL VALVE E/A RATIO: 0.76
MITRAL VALVE E/E' RATIO: 13.61
RIGHT VENTRICLE FREE WALL PEAK S': 10.95 CM/S
RIGHT VENTRICLE PEAK SYSTOLIC PRESSURE: 29.5 MMHG
TRICUSPID ANNULAR PLANE SYSTOLIC EXCURSION: 2.3 CM

## 2024-08-14 PROCEDURE — 93306 TTE W/DOPPLER COMPLETE: CPT | Performed by: INTERNAL MEDICINE

## 2024-08-14 PROCEDURE — 93306 TTE W/DOPPLER COMPLETE: CPT

## 2024-09-03 ENCOUNTER — APPOINTMENT (OUTPATIENT)
Dept: PULMONOLOGY | Facility: HOSPITAL | Age: 84
End: 2024-09-03
Payer: MEDICARE

## 2024-09-03 ASSESSMENT — ENCOUNTER SYMPTOMS
WHEEZING: 0
SHORTNESS OF BREATH: 0
RHINORRHEA: 0
COUGH: 0
UNEXPECTED WEIGHT CHANGE: 0
FATIGUE: 0
FEVER: 0
CHILLS: 0

## 2024-09-03 NOTE — PROGRESS NOTES
Subjective   Patient ID: Delmis Ladd is a 84 y.o. female who presents for NPV for chronic cough.     HPI: Patient has PMH of TIA, CAD, and atrial fibrillation. She was referred for chronic cough.     Review of Systems   Constitutional:  Negative for chills, fatigue, fever and unexpected weight change.   HENT:  Negative for congestion, postnasal drip and rhinorrhea.    Respiratory:  Negative for cough (denies hemoptysis.), shortness of breath and wheezing.    Cardiovascular:  Negative for chest pain and leg swelling.   All other systems reviewed and are negative.      Objective   Physical Exam  Vitals reviewed.   Constitutional:       Appearance: Normal appearance.   HENT:      Head: Normocephalic.   Cardiovascular:      Rate and Rhythm: Normal rate and regular rhythm.   Pulmonary:      Effort: Pulmonary effort is normal.      Breath sounds: Normal breath sounds.   Skin:     General: Skin is warm and dry.   Neurological:      Mental Status: She is alert.         Assessment/Plan

## 2024-09-04 ENCOUNTER — TELEPHONE (OUTPATIENT)
Dept: CARDIOLOGY | Facility: HOSPITAL | Age: 84
End: 2024-09-04
Payer: MEDICARE

## 2024-09-04 NOTE — TELEPHONE ENCOUNTER
Left her a message - Dr. Jones reviewed your echo results.  Echo looks stable/improved.  Follow up as scheduled with Katty on 1/16/2025 at 2:00 PM.

## 2024-09-04 NOTE — TELEPHONE ENCOUNTER
----- Message from Christophe Jones sent at 8/21/2024  1:36 PM EDT -----  Results reviewed. No critical results, significant improvement in LVH, follow up as usual to discuss in details.

## 2024-09-17 ENCOUNTER — OFFICE VISIT (OUTPATIENT)
Dept: PULMONOLOGY | Facility: HOSPITAL | Age: 84
End: 2024-09-17
Payer: MEDICARE

## 2024-09-17 VITALS
DIASTOLIC BLOOD PRESSURE: 74 MMHG | BODY MASS INDEX: 28.58 KG/M2 | WEIGHT: 167.4 LBS | OXYGEN SATURATION: 97 % | HEIGHT: 64 IN | TEMPERATURE: 96.6 F | HEART RATE: 51 BPM | SYSTOLIC BLOOD PRESSURE: 160 MMHG | RESPIRATION RATE: 16 BRPM

## 2024-09-17 DIAGNOSIS — J30.9 ALLERGIC RHINITIS, UNSPECIFIED SEASONALITY, UNSPECIFIED TRIGGER: Primary | ICD-10-CM

## 2024-09-17 DIAGNOSIS — R05.1 ACUTE COUGH: ICD-10-CM

## 2024-09-17 PROCEDURE — 99213 OFFICE O/P EST LOW 20 MIN: CPT | Performed by: NURSE PRACTITIONER

## 2024-09-17 PROCEDURE — 99203 OFFICE O/P NEW LOW 30 MIN: CPT | Performed by: NURSE PRACTITIONER

## 2024-09-17 PROCEDURE — 1160F RVW MEDS BY RX/DR IN RCRD: CPT | Performed by: NURSE PRACTITIONER

## 2024-09-17 PROCEDURE — 3077F SYST BP >= 140 MM HG: CPT | Performed by: NURSE PRACTITIONER

## 2024-09-17 PROCEDURE — 1036F TOBACCO NON-USER: CPT | Performed by: NURSE PRACTITIONER

## 2024-09-17 PROCEDURE — 1159F MED LIST DOCD IN RCRD: CPT | Performed by: NURSE PRACTITIONER

## 2024-09-17 PROCEDURE — 3078F DIAST BP <80 MM HG: CPT | Performed by: NURSE PRACTITIONER

## 2024-09-17 RX ORDER — AZELASTINE 1 MG/ML
1 SPRAY, METERED NASAL 2 TIMES DAILY
Qty: 1 ML | Refills: 11 | Status: SHIPPED | OUTPATIENT
Start: 2024-09-17

## 2024-09-17 RX ORDER — NAPROXEN SODIUM 220 MG/1
TABLET ORAL
COMMUNITY

## 2024-09-17 RX ORDER — LORATADINE 10 MG/1
10 TABLET ORAL DAILY
Qty: 30 TABLET | Refills: 11 | Status: SHIPPED | OUTPATIENT
Start: 2024-09-17

## 2024-09-17 ASSESSMENT — ENCOUNTER SYMPTOMS
UNEXPECTED WEIGHT CHANGE: 0
FATIGUE: 0
CHILLS: 0
COUGH: 1
SHORTNESS OF BREATH: 0
FEVER: 0
WHEEZING: 0
LOSS OF SENSATION IN FEET: 0
OCCASIONAL FEELINGS OF UNSTEADINESS: 0
DEPRESSION: 0
RHINORRHEA: 1

## 2024-09-17 ASSESSMENT — PATIENT HEALTH QUESTIONNAIRE - PHQ9
SUM OF ALL RESPONSES TO PHQ9 QUESTIONS 1 AND 2: 0
2. FEELING DOWN, DEPRESSED OR HOPELESS: NOT AT ALL
1. LITTLE INTEREST OR PLEASURE IN DOING THINGS: NOT AT ALL

## 2024-09-17 ASSESSMENT — COLUMBIA-SUICIDE SEVERITY RATING SCALE - C-SSRS
1. IN THE PAST MONTH, HAVE YOU WISHED YOU WERE DEAD OR WISHED YOU COULD GO TO SLEEP AND NOT WAKE UP?: NO
2. HAVE YOU ACTUALLY HAD ANY THOUGHTS OF KILLING YOURSELF?: NO
6. HAVE YOU EVER DONE ANYTHING, STARTED TO DO ANYTHING, OR PREPARED TO DO ANYTHING TO END YOUR LIFE?: NO

## 2024-09-17 NOTE — PATIENT INSTRUCTIONS
Start on Loratadine one tablet once a day for now for sinus drainage.  Start on Azelastine nasal spray 1-2 times per day as needed also for sinus drainage.   Call me if your cough does not resolve.   Follow up with me as needed.

## 2024-09-17 NOTE — PROGRESS NOTES
Subjective   Patient ID: Delmis Ladd is a 84 y.o. female who presents for NPV for cough.     HPI: Patient has PMH of TIA, CAD, and atrial fibrillation. She was referred for chronic cough. She states that her primary did not refer her but her insurance company made her come to get a diagnosis for the cough. She states that she has a persistent dry cough. She states that this started last month in August 2024. She does have sinus drainage frequently. She denies any history of GERD. She states that the cough is not everyday and is just a few times per day, she states that it is only about 2-3 days out of the week. She denies ever having a coughing spell or any shortness of breath. She does not take anything for sinus drainage. She has never smoked and was not exposed to significant second hand smoke.     Review of Systems   Constitutional:  Negative for chills, fatigue, fever and unexpected weight change.   HENT:  Positive for postnasal drip and rhinorrhea. Negative for congestion.    Respiratory:  Positive for cough (denies hemoptysis.). Negative for shortness of breath and wheezing.    Cardiovascular:  Negative for chest pain and leg swelling.   All other systems reviewed and are negative.      Objective   Physical Exam  Vitals reviewed.   Constitutional:       Appearance: Normal appearance.   HENT:      Head: Normocephalic.   Cardiovascular:      Rate and Rhythm: Normal rate and regular rhythm.   Pulmonary:      Effort: Pulmonary effort is normal.      Breath sounds: Normal breath sounds.   Skin:     General: Skin is warm and dry.   Neurological:      Mental Status: She is alert.         Assessment/Plan   Acute cough  Allergic rhinitis     Plan:    Patient is here for a cough that started in August 2024, it is not everyday and it is only about 2-3 times per week. She states she coughs around 3 times per day and denies coughing fits. She states that her insurance company referred her to get a diagnosis for the  "cough. She reports significant sinus drainage/post nasal drip. She has tried Tessalon perles for the cough with minimal benefit. She denies having a cold when the cough started. Based on history and clinical symptoms I suspect that cough is secondary to allergic rhinitis. I will start her on Loratadine and Azelastine. If cough resolves no further follow up is necessary.     -Start on Loratadine daily.  -Start Azelastine 1-2 times per day.   -CXR from December 2023 was normal.     Overall I will optimize allergic rhinitis management for her today which is what I suspect is causing her cough. If her cough resolves no further follow up is needed, if cough persists and becomes \"chronic\" will then trial medications for GERD and obtain testing. No s/s of chronic lung disease and patient is not on lisinopril. She will notify me if cough does not improve, otherwise she will continue to follow up with PCP.     Total time:  40 min.  "

## 2025-01-13 PROBLEM — R26.9 ABNORMAL GAIT: Status: ACTIVE | Noted: 2025-01-13

## 2025-01-13 PROBLEM — I48.20 CHRONIC A-FIB (MULTI): Status: ACTIVE | Noted: 2021-04-14

## 2025-01-13 PROBLEM — G83.10 PARESIS OF LOWER EXTREMITY (MULTI): Status: ACTIVE | Noted: 2025-01-13

## 2025-01-13 PROBLEM — I10 HYPERTENSIVE DISORDER: Status: ACTIVE | Noted: 2021-04-14

## 2025-01-13 PROBLEM — R41.3 MEMORY IMPAIRMENT: Status: ACTIVE | Noted: 2025-01-13

## 2025-01-13 PROBLEM — Z86.73 HISTORY OF TRANSIENT ISCHEMIC ATTACK: Status: ACTIVE | Noted: 2023-05-12

## 2025-01-13 PROBLEM — M19.079 PRIMARY OSTEOARTHRITIS OF ANKLE: Status: ACTIVE | Noted: 2025-01-13

## 2025-01-13 PROBLEM — M25.559 ARTHRALGIA OF HIP: Status: ACTIVE | Noted: 2025-01-13

## 2025-01-13 PROBLEM — M16.9 OSTEOARTHRITIS OF HIP: Status: ACTIVE | Noted: 2021-04-14

## 2025-01-13 PROBLEM — M25.579 ANKLE PAIN: Status: ACTIVE | Noted: 2025-01-13

## 2025-01-13 PROBLEM — M25.512 PAIN OF LEFT SHOULDER REGION: Status: ACTIVE | Noted: 2023-03-13

## 2025-01-13 PROBLEM — Z98.61 STATUS POST PERCUTANEOUS TRANSLUMINAL CORONARY ANGIOPLASTY: Status: ACTIVE | Noted: 2025-01-13

## 2025-01-13 PROBLEM — Z86.16 PERSONAL HISTORY OF COVID-19: Status: ACTIVE | Noted: 2023-05-12

## 2025-01-13 PROBLEM — S93.609A SPRAIN OF FOOT: Status: ACTIVE | Noted: 2023-05-12

## 2025-01-13 PROBLEM — I25.10 ARTERIOSCLEROSIS OF CORONARY ARTERY: Status: ACTIVE | Noted: 2025-01-13

## 2025-01-13 PROBLEM — Z86.79 HISTORY OF CARDIAC ARRHYTHMIA: Status: ACTIVE | Noted: 2025-01-13

## 2025-01-13 PROBLEM — M54.16 LUMBAR RADICULOPATHY: Status: ACTIVE | Noted: 2023-03-13

## 2025-01-16 ENCOUNTER — APPOINTMENT (OUTPATIENT)
Dept: CARDIOLOGY | Facility: CLINIC | Age: 85
End: 2025-01-16
Payer: MEDICARE

## 2025-01-26 NOTE — PROGRESS NOTES
Chief Complaint/Reason for Visit:   Patient is coming in today as a 6 month Cardiovascular follow up.     History Of Present Illness:    Ms. Ladd is coming today as a 6-month cardiovascular follow-up.  We have followed this patient previously for atrial fibrillation, coronary artery disease, paroxysmal supraventricular tachycardia, hypertrophic cardiomyopathy with hypertensive heart disease, and prior TIA.  She had an NSTEMI in 2020 with mid LAD stent.  She has had cardioversions in 2021 and 2022.    Patient denies any chest pain, pressure, palpitations, or orthopnea.  She has had some chronic lower extremity edema.  She admits to being sedentary and not watching the sodium in her diet.  She eats a lot of prepared foods.  She is taking her medication as prescribed.  She has not had any recent hospitalizations.  She is unclear when she last had lab work, it appears she gets most of it through her PCP office.    Past Medical History:  She has a past medical history of Other ventricular tachycardia, Personal history of other diseases of the circulatory system, Personal history of transient ischemic attack (TIA), and cerebral infarction without residual deficits, and Subsequent non-ST elevation (NSTEMI) myocardial infarction (12/08/2023).    Past Surgical History:  She has a past surgical history that includes Hysterectomy (02/07/2017) and Other surgical history (02/07/2017).      Social History:  She reports that she has never smoked. She has never used smokeless tobacco. Alcohol use questions deferred to the physician. Drug use questions deferred to the physician.    Family History:  No family history on file.     Allergies:  Aspirin, Amoxicillin, and Lisinopril    Medications:  Current Outpatient Medications   Medication Instructions    acetaminophen (TYLENOL) 500 mg, As needed    amiodarone (PACERONE) 200 mg, oral, Daily    apixaban (ELIQUIS) 5 mg, oral, 2 times daily    atorvastatin (LIPITOR) 80 mg, oral, Daily     "calcium carbonate 600 mg calcium (1,500 mg) tablet 1 tablet, Daily    clopidogrel (PLAVIX) 75 mg, oral, Daily    levothyroxine (SYNTHROID, LEVOXYL) 50 mcg, Daily    metoprolol tartrate (LOPRESSOR) 75 mg, oral, 2 times daily    multivitamin with minerals (Adults Multivitamin) tablet 1 tablet, Daily    omega 3-dha-epa-fish oil (Fish OiL) 1,200 (144-216) mg capsule Take by mouth.       Review of Systems:  Constitutional: feeling tired, but not feeling poorly.   Eyes: eyesight problems and blurred vision.   ENT: no hearing loss.   Cardiovascular: chronic lower extremity edema, but no chest pain, no tightness or heavy pressure and no palpitations . ankle edema.   Respiratory: shortness of breath during exertion, but no orthopnea.   Gastrointestinal: no change in bowel habits and no blood in stools.   Genitourinary: no urinary frequency and no hematuria.   Musculoskeletal: Has some pedal edema that has been present for months   Skin: no skin rashes.   Neurological: frequent falls  and dizziness.   Psychiatric: no confusion and no memory lapses or loss.   Endocrine: no thyroid disorder and no diabetes mellitus.        Vitals  Visit Vitals  /60 (BP Location: Left arm, Patient Position: Sitting, BP Cuff Size: Adult)   Pulse 55   Ht 1.626 m (5' 4\")   Wt 78.9 kg (174 lb)   SpO2 99%   BMI 29.87 kg/m²   Smoking Status Never   BSA 1.89 m²        ROS:  Constitutional: feeling tired, but not feeling poorly.   Eyes: eyesight problems and blurred vision.   ENT: no hearing loss.   Cardiovascular: lower extremity edema, but no chest pain, no tightness or heavy pressure and no palpitations . ankle edema.   Respiratory: shortness of breath during exertion, but no orthopnea.   Gastrointestinal: no change in bowel habits and no blood in stools.   Genitourinary: no urinary frequency and no hematuria.   Musculoskeletal: no arthralgias. Uses cane  Skin: no skin rashes.   Neurological: frequent falls  and dizziness.   Psychiatric: no " confusion and no memory lapses or loss.   Endocrine: no thyroid disorder and no diabetes mellitus.       PHYSICAL EXAM:   Constitutional: alert and in no acute distress.   Eyes: no erythema, swelling or discharge from the eye .   Ears, Nose, Mouth, and Throat: external inspection of ears and nose is normal .   Neck: neck is supple, symmetric, trachea midline, no masses  and no thyromegaly .   Pulmonary: no increased work of breathing or signs of respiratory distress  and lungs clear to auscultation.    Cardiovascular: carotid pulses 2+ bilaterally with no bruit , JVP was normal,  the heart rate was normal the rhythm was regular, normal S1, normal S2, no S3, no S4 no murmurs were heard., pedal pulses 2+ bilaterally, ankles with 1+ edema  Abdomen: abdomen non-tender, no masses .   Musculoskeletal: normal gait.   Skin: skin warm and dry, normal skin turgor .   Neurologic: non-focal neurologic examination.   Psychiatric oriented to person, place and time  and normal mood and affect .          Last Labs:  CBC -  Lab Results   Component Value Date    WBC 7.1 09/23/2022    HGB 13.2 09/23/2022    HCT 39.6 09/23/2022    MCV 96 09/23/2022     09/23/2022     Lab Results   Component Value Date    GLUCOSE 78 12/28/2023    CALCIUM 9.2 12/28/2023     12/28/2023    K 4.1 12/28/2023    CO2 30 12/28/2023     12/28/2023    BUN 17 12/28/2023    CREATININE 0.93 12/28/2023      CMP -  Lab Results   Component Value Date    CALCIUM 9.2 12/28/2023    PROT 6.7 05/09/2022    ALBUMIN 4.3 05/09/2022    AST 30 12/28/2023    ALT 37 12/28/2023    ALKPHOS 67 05/09/2022    BILITOT 0.6 05/09/2022       LIPID PANEL -   Lab Results   Component Value Date    CHOL 156 09/09/2023    TRIG 165 (H) 09/09/2023    HDL 69.6 09/09/2023    CHHDL 2.2 09/09/2023    LDLF 53 09/09/2023    VLDL 33 09/09/2023       Lab Results   Component Value Date     (H) 03/24/2021    HGBA1C 6.1 09/07/2020       Last Cardiology Tests:   EKG:   EKG done in  the office today and personally reviewed shows sinus bradycardia at 51 bpm, QT corrected interval 496 ms.  There are no significant ST or T wave abnormalities.  This will go to Dr. Jones for final review.    Echo: 8-14-24  CONCLUSIONS:   1. Left ventricular ejection fraction is normal, with a calculated ejection fraction of 61% by auto EF.   2. Spectral Doppler shows an impaired relaxation pattern of left ventricular diastolic filling.   3. Moderately increased left ventricular septal thickness.   4. There is normal right ventricular global systolic function.   5. The left atrium is mild to moderately dilated.   6. There is moderate mitral annular calcification.   7. Mild aortic valve regurgitation.    Cath:9-8-20  CONCLUSIONS:   1. NSTEMI. Prox-mid LAD calcified, eccentric ~ 70% stenosis. Abnormal by RFR, 0.85.   2. Successful PCI prox-mid LAD with Xience Jessica 2.75 x 28mm LIZY, postdil NC 3.0mm balloon at 16-20 es.   3. Mild nonobstructive disease involving prox-mid LCx and proximal RCA.   4. No significant LV-AO peak to peak pullback gradient.   5. Left Ventricular end-diastolic pressure = 14.       Lab review: No recent labs    Assessment/Plan:  Persistent atrial fibrillation: Patient has a history of atrial fibrillation with a cardioversion done in 2021 and 2022.  She is maintaining sinus rhythm with use of amiodarone.  I reminded her that she is overdue for amiodarone labs and she will get them done in the next 1 to 2 weeks.  Patient's EKG today is acceptable, QT interval is 496 ms.  Patient has an elevated [id number] score requiring anticoagulation.  She is not having any abnormal bleeding or bruising and should continue on Eliquis 5 mg twice daily.    Coronary artery disease: Patient had an NSTEMI in 2020 with mid LAD stent.  He she currently is angina class 0.  She is on atorvastatin, Plavix, metoprolol, and fish oil.  She is not on aspirin due to the need for anticoagulation.  Patient will  continue on her current regimen.    Paroxysmal supraventricular tachycardia: Patient has not had any tachycardia or palpitations that she knows of.  She should continue on metoprolol tartrate.    Lower extremity edema: Patient has dependent edema.  She has no JVD, lungs are clear.  I recommended that she wear support stockings and limit the sodium in her diet to 2000 mg/day or less.  She also should elevate her legs when she is sitting for a prolonged time.    Hypertension: Blood pressure today is mildly elevated.  Patient tells me it is normally better controlled.  She will continue on her current dose of metoprolol tartrate 75 mg twice daily.  I encouraged her to work on lowering the sodium in her diet.    Patient will get fasting blood work done in the next couple of weeks.  We will call or message her with the results.  She will follow-up with Dr. Jones in July as planned.  Patient instructed to call with any cardiovascular complaints. All questions were answered.       Dragon dictation was utilized to create this document. Quite often unanticipated grammatical, syntax,  and other interpretive errors are inadvertently transcribed by the computer software.  Please disregard these errors.  Please excuse any errors that have escaped final proofreading.             Dasha Bender, APRN-CNP

## 2025-01-30 ENCOUNTER — APPOINTMENT (OUTPATIENT)
Dept: CARDIOLOGY | Facility: HOSPITAL | Age: 85
End: 2025-01-30
Payer: MEDICARE

## 2025-01-30 VITALS
WEIGHT: 174 LBS | SYSTOLIC BLOOD PRESSURE: 140 MMHG | HEART RATE: 55 BPM | OXYGEN SATURATION: 99 % | BODY MASS INDEX: 29.71 KG/M2 | DIASTOLIC BLOOD PRESSURE: 60 MMHG | HEIGHT: 64 IN

## 2025-01-30 DIAGNOSIS — I25.10 ASHD (ARTERIOSCLEROTIC HEART DISEASE): ICD-10-CM

## 2025-01-30 DIAGNOSIS — I10 BENIGN ESSENTIAL HYPERTENSION: ICD-10-CM

## 2025-01-30 DIAGNOSIS — Z98.61 S/P PTCA (PERCUTANEOUS TRANSLUMINAL CORONARY ANGIOPLASTY): ICD-10-CM

## 2025-01-30 DIAGNOSIS — R60.9 EDEMA, UNSPECIFIED TYPE: ICD-10-CM

## 2025-01-30 DIAGNOSIS — I48.19 PERSISTENT ATRIAL FIBRILLATION (MULTI): ICD-10-CM

## 2025-01-30 DIAGNOSIS — I51.7 LEFT VENTRICULAR HYPERTROPHY: ICD-10-CM

## 2025-01-30 DIAGNOSIS — I47.10 PAROXYSMAL SUPRAVENTRICULAR TACHYCARDIA (CMS-HCC): ICD-10-CM

## 2025-01-30 DIAGNOSIS — E78.5 HYPERLIPIDEMIA, UNSPECIFIED HYPERLIPIDEMIA TYPE: ICD-10-CM

## 2025-01-30 DIAGNOSIS — I48.0 PAROXYSMAL ATRIAL FIBRILLATION (MULTI): Primary | ICD-10-CM

## 2025-01-30 PROCEDURE — 99214 OFFICE O/P EST MOD 30 MIN: CPT | Performed by: NURSE PRACTITIONER

## 2025-01-30 PROCEDURE — 3077F SYST BP >= 140 MM HG: CPT | Performed by: NURSE PRACTITIONER

## 2025-01-30 PROCEDURE — 1159F MED LIST DOCD IN RCRD: CPT | Performed by: NURSE PRACTITIONER

## 2025-01-30 PROCEDURE — 1160F RVW MEDS BY RX/DR IN RCRD: CPT | Performed by: NURSE PRACTITIONER

## 2025-01-30 PROCEDURE — 99214 OFFICE O/P EST MOD 30 MIN: CPT | Mod: 25 | Performed by: NURSE PRACTITIONER

## 2025-01-30 PROCEDURE — 3078F DIAST BP <80 MM HG: CPT | Performed by: NURSE PRACTITIONER

## 2025-01-30 PROCEDURE — 93010 ELECTROCARDIOGRAM REPORT: CPT | Performed by: INTERNAL MEDICINE

## 2025-01-30 PROCEDURE — 1036F TOBACCO NON-USER: CPT | Performed by: NURSE PRACTITIONER

## 2025-01-30 PROCEDURE — 93005 ELECTROCARDIOGRAM TRACING: CPT | Performed by: NURSE PRACTITIONER

## 2025-01-30 RX ORDER — AMIODARONE HYDROCHLORIDE 200 MG/1
200 TABLET ORAL DAILY
Qty: 90 TABLET | Refills: 1 | Status: SHIPPED | OUTPATIENT
Start: 2025-01-30

## 2025-01-30 RX ORDER — CLOPIDOGREL BISULFATE 75 MG/1
75 TABLET ORAL DAILY
Qty: 90 TABLET | Refills: 1 | Status: SHIPPED | OUTPATIENT
Start: 2025-01-30

## 2025-01-30 RX ORDER — ATORVASTATIN CALCIUM 80 MG/1
80 TABLET, FILM COATED ORAL DAILY
Qty: 90 TABLET | Refills: 1 | Status: SHIPPED | OUTPATIENT
Start: 2025-01-30

## 2025-01-30 RX ORDER — METOPROLOL TARTRATE 75 MG/1
75 TABLET ORAL 2 TIMES DAILY
Qty: 180 TABLET | Refills: 1 | Status: SHIPPED | OUTPATIENT
Start: 2025-01-30

## 2025-01-30 NOTE — PATIENT INSTRUCTIONS
Continue on current meds  Heart healthy, low sodium diet  Limit sodium to 2000mg per day  Support hose ordered  Mediterranean diet is recommended  Fasting labs due in the next 1-2 weeks  Follow up with Dr Jones in July

## 2025-01-31 LAB
ATRIAL RATE: 51 BPM
P OFFSET: 178 MS
P ONSET: 115 MS
PR INTERVAL: 198 MS
Q ONSET: 214 MS
QRS COUNT: 8 BEATS
QRS DURATION: 96 MS
QT INTERVAL: 496 MS
QTC CALCULATION(BAZETT): 457 MS
QTC FREDERICIA: 470 MS
R AXIS: -35 DEGREES
T AXIS: 22 DEGREES
T OFFSET: 462 MS
VENTRICULAR RATE: 51 BPM

## 2025-02-27 ENCOUNTER — TELEPHONE (OUTPATIENT)
Dept: CARDIOLOGY | Facility: HOSPITAL | Age: 85
End: 2025-02-27
Payer: MEDICARE

## 2025-02-27 NOTE — TELEPHONE ENCOUNTER
LVM letting us know pt cannot get lab drawn there and needs to go to another facility. Calling just to let us know. Asked for us to reach out to pt and give them another facility to get labs done.    Thank you!  Shamir PCNA

## 2025-02-28 NOTE — TELEPHONE ENCOUNTER
Called patient and she said she will go to a  lab within the next 30 days to get lab work completed.

## 2025-03-05 LAB
ALT SERPL-CCNC: 38 U/L (ref 6–29)
ANION GAP SERPL CALCULATED.4IONS-SCNC: 12 MMOL/L (CALC) (ref 7–17)
AST SERPL-CCNC: 33 U/L (ref 10–35)
BUN SERPL-MCNC: 23 MG/DL (ref 7–25)
BUN/CREAT SERPL: 23 (CALC) (ref 6–22)
CALCIUM SERPL-MCNC: 9.4 MG/DL (ref 8.6–10.4)
CHLORIDE SERPL-SCNC: 104 MMOL/L (ref 98–110)
CHOLEST SERPL-MCNC: 128 MG/DL
CHOLEST/HDLC SERPL: 1.8 (CALC)
CO2 SERPL-SCNC: 27 MMOL/L (ref 20–32)
CREAT SERPL-MCNC: 1.02 MG/DL (ref 0.6–0.95)
EGFRCR SERPLBLD CKD-EPI 2021: 54 ML/MIN/1.73M2
GLUCOSE SERPL-MCNC: 94 MG/DL (ref 65–99)
HDLC SERPL-MCNC: 70 MG/DL
LDLC SERPL CALC-MCNC: 43 MG/DL (CALC)
NONHDLC SERPL-MCNC: 58 MG/DL (CALC)
POTASSIUM SERPL-SCNC: 4.3 MMOL/L (ref 3.5–5.3)
SODIUM SERPL-SCNC: 143 MMOL/L (ref 135–146)
T4 FREE SERPL-MCNC: 1.3 NG/DL (ref 0.8–1.8)
TRIGL SERPL-MCNC: 70 MG/DL
TSH SERPL-ACNC: 10.34 MIU/L (ref 0.4–4.5)

## 2025-03-05 NOTE — RESULT ENCOUNTER NOTE
ALT is somewhat abnormal with patient being on amiodarone that needs close monitoring, please repeat AST ALT in 3 months and if it remains elevated refer to EP for consideration of something other than amiodarone.

## 2025-03-07 ENCOUNTER — TELEPHONE (OUTPATIENT)
Dept: CARDIOLOGY | Facility: HOSPITAL | Age: 85
End: 2025-03-07
Payer: MEDICARE

## 2025-03-07 DIAGNOSIS — I48.0 PAROXYSMAL ATRIAL FIBRILLATION (MULTI): ICD-10-CM

## 2025-03-07 NOTE — PROGRESS NOTES
After reviewing ALT bloodwork Dr. Jones recommends rechecking the bloodwork in 3 months, staff will out new orders in for that. Discussed with patient, she verbalized understanding.

## 2025-03-07 NOTE — TELEPHONE ENCOUNTER
After reviewing ALT bloodwork Dr. Jones recommends rechecking the bloodwork in 3 months, staff will put new orders in for that. Discussed with patient, she verbalized understanding. Patient also asked that we mail a physical copy of orders to the address on file, staff will handle today.

## 2025-06-03 DIAGNOSIS — I10 BENIGN ESSENTIAL HYPERTENSION: ICD-10-CM

## 2025-06-03 RX ORDER — METOPROLOL TARTRATE 75 MG/1
75 TABLET ORAL 2 TIMES DAILY
Qty: 180 TABLET | Refills: 3 | Status: SHIPPED | OUTPATIENT
Start: 2025-06-03

## 2025-06-07 DIAGNOSIS — I48.0 PAROXYSMAL ATRIAL FIBRILLATION (MULTI): ICD-10-CM

## 2025-06-20 PROBLEM — L03.116 CELLULITIS OF LEFT LOWER EXTREMITY: Status: ACTIVE | Noted: 2024-11-22

## 2025-07-16 ENCOUNTER — TELEPHONE (OUTPATIENT)
Dept: CARDIOLOGY | Facility: HOSPITAL | Age: 85
End: 2025-07-16

## 2025-07-16 ENCOUNTER — APPOINTMENT (OUTPATIENT)
Dept: CARDIOLOGY | Facility: CLINIC | Age: 85
End: 2025-07-16
Payer: MEDICARE

## 2025-07-16 NOTE — TELEPHONE ENCOUNTER
Pt called to r/s appt as her transportation fell through today. She is agreeable to 9/18 12:20pm.

## 2025-07-28 DIAGNOSIS — I48.0 PAROXYSMAL ATRIAL FIBRILLATION (MULTI): ICD-10-CM

## 2025-09-18 ENCOUNTER — APPOINTMENT (OUTPATIENT)
Dept: CARDIOLOGY | Facility: HOSPITAL | Age: 85
End: 2025-09-18
Payer: MEDICARE